# Patient Record
Sex: MALE | NOT HISPANIC OR LATINO | URBAN - METROPOLITAN AREA
[De-identification: names, ages, dates, MRNs, and addresses within clinical notes are randomized per-mention and may not be internally consistent; named-entity substitution may affect disease eponyms.]

---

## 2023-05-04 ENCOUNTER — APPOINTMENT (OUTPATIENT)
Dept: PRIMARY CARE | Facility: CLINIC | Age: 23
End: 2023-05-04
Payer: COMMERCIAL

## 2024-03-15 ENCOUNTER — LAB (OUTPATIENT)
Dept: LAB | Facility: LAB | Age: 24
End: 2024-03-15
Payer: COMMERCIAL

## 2024-03-15 DIAGNOSIS — Z79.899 OTHER LONG TERM (CURRENT) DRUG THERAPY: Primary | ICD-10-CM

## 2024-03-15 LAB
ALBUMIN SERPL BCP-MCNC: 4.5 G/DL (ref 3.4–5)
ALP SERPL-CCNC: 52 U/L (ref 33–120)
ALT SERPL W P-5'-P-CCNC: 11 U/L (ref 10–52)
ANION GAP SERPL CALC-SCNC: 10 MMOL/L (ref 10–20)
AST SERPL W P-5'-P-CCNC: 19 U/L (ref 9–39)
BASOPHILS # BLD AUTO: 0.02 X10*3/UL (ref 0–0.1)
BASOPHILS NFR BLD AUTO: 0.5 %
BILIRUB SERPL-MCNC: 0.7 MG/DL (ref 0–1.2)
BUN SERPL-MCNC: 11 MG/DL (ref 6–23)
CALCIUM SERPL-MCNC: 9.7 MG/DL (ref 8.6–10.6)
CHLORIDE SERPL-SCNC: 101 MMOL/L (ref 98–107)
CHOLEST SERPL-MCNC: 130 MG/DL (ref 0–199)
CHOLESTEROL/HDL RATIO: 2.3
CO2 SERPL-SCNC: 29 MMOL/L (ref 21–32)
CREAT SERPL-MCNC: 1.01 MG/DL (ref 0.5–1.3)
EGFRCR SERPLBLD CKD-EPI 2021: >90 ML/MIN/1.73M*2
EOSINOPHIL # BLD AUTO: 0.08 X10*3/UL (ref 0–0.7)
EOSINOPHIL NFR BLD AUTO: 2.1 %
ERYTHROCYTE [DISTWIDTH] IN BLOOD BY AUTOMATED COUNT: 12.9 % (ref 11.5–14.5)
EST. AVERAGE GLUCOSE BLD GHB EST-MCNC: 97 MG/DL
GLUCOSE SERPL-MCNC: 79 MG/DL (ref 74–99)
HBA1C MFR BLD: 5 %
HCT VFR BLD AUTO: 50.5 % (ref 41–52)
HDLC SERPL-MCNC: 55.4 MG/DL
HGB BLD-MCNC: 15.9 G/DL (ref 13.5–17.5)
IMM GRANULOCYTES # BLD AUTO: 0.01 X10*3/UL (ref 0–0.7)
IMM GRANULOCYTES NFR BLD AUTO: 0.3 % (ref 0–0.9)
LDLC SERPL CALC-MCNC: 57 MG/DL
LYMPHOCYTES # BLD AUTO: 1.45 X10*3/UL (ref 1.2–4.8)
LYMPHOCYTES NFR BLD AUTO: 37.4 %
MCH RBC QN AUTO: 27.7 PG (ref 26–34)
MCHC RBC AUTO-ENTMCNC: 31.5 G/DL (ref 32–36)
MCV RBC AUTO: 88 FL (ref 80–100)
MONOCYTES # BLD AUTO: 0.31 X10*3/UL (ref 0.1–1)
MONOCYTES NFR BLD AUTO: 8 %
NEUTROPHILS # BLD AUTO: 2.01 X10*3/UL (ref 1.2–7.7)
NEUTROPHILS NFR BLD AUTO: 51.7 %
NON HDL CHOLESTEROL: 75 MG/DL (ref 0–149)
NRBC BLD-RTO: 0 /100 WBCS (ref 0–0)
PLATELET # BLD AUTO: 223 X10*3/UL (ref 150–450)
POTASSIUM SERPL-SCNC: 4.3 MMOL/L (ref 3.5–5.3)
PROT SERPL-MCNC: 7.5 G/DL (ref 6.4–8.2)
RBC # BLD AUTO: 5.74 X10*6/UL (ref 4.5–5.9)
SODIUM SERPL-SCNC: 136 MMOL/L (ref 136–145)
TRIGL SERPL-MCNC: 86 MG/DL (ref 0–149)
TSH SERPL-ACNC: 0.98 MIU/L (ref 0.44–3.98)
VLDL: 17 MG/DL (ref 0–40)
WBC # BLD AUTO: 3.9 X10*3/UL (ref 4.4–11.3)

## 2024-03-15 PROCEDURE — 80053 COMPREHEN METABOLIC PANEL: CPT

## 2024-03-15 PROCEDURE — 36415 COLL VENOUS BLD VENIPUNCTURE: CPT

## 2024-03-15 PROCEDURE — 85025 COMPLETE CBC W/AUTO DIFF WBC: CPT

## 2024-03-15 PROCEDURE — 80061 LIPID PANEL: CPT

## 2024-03-15 PROCEDURE — 84443 ASSAY THYROID STIM HORMONE: CPT

## 2024-03-15 PROCEDURE — 83036 HEMOGLOBIN GLYCOSYLATED A1C: CPT

## 2024-07-22 ENCOUNTER — APPOINTMENT (OUTPATIENT)
Dept: CARDIOLOGY | Facility: HOSPITAL | Age: 24
End: 2024-07-22
Payer: COMMERCIAL

## 2024-07-22 ENCOUNTER — HOSPITAL ENCOUNTER (EMERGENCY)
Facility: HOSPITAL | Age: 24
Discharge: OTHER INSTITUTION | End: 2024-07-23
Attending: STUDENT IN AN ORGANIZED HEALTH CARE EDUCATION/TRAINING PROGRAM
Payer: COMMERCIAL

## 2024-07-22 DIAGNOSIS — F29 PSYCHOSIS, UNSPECIFIED PSYCHOSIS TYPE (MULTI): Primary | ICD-10-CM

## 2024-07-22 LAB
ALBUMIN SERPL BCP-MCNC: 4.6 G/DL (ref 3.4–5)
ALP SERPL-CCNC: 62 U/L (ref 33–120)
ALT SERPL W P-5'-P-CCNC: 11 U/L (ref 10–52)
AMPHETAMINES UR QL SCN: NORMAL
ANION GAP SERPL CALC-SCNC: 17 MMOL/L (ref 10–20)
APAP SERPL-MCNC: <10 UG/ML
AST SERPL W P-5'-P-CCNC: 28 U/L (ref 9–39)
BARBITURATES UR QL SCN: NORMAL
BASOPHILS # BLD AUTO: 0.03 X10*3/UL (ref 0–0.1)
BASOPHILS NFR BLD AUTO: 0.4 %
BENZODIAZ UR QL SCN: NORMAL
BILIRUB SERPL-MCNC: 0.9 MG/DL (ref 0–1.2)
BUN SERPL-MCNC: 9 MG/DL (ref 6–23)
BZE UR QL SCN: NORMAL
CALCIUM SERPL-MCNC: 9.2 MG/DL (ref 8.6–10.3)
CANNABINOIDS UR QL SCN: NORMAL
CHLORIDE SERPL-SCNC: 97 MMOL/L (ref 98–107)
CO2 SERPL-SCNC: 24 MMOL/L (ref 21–32)
CREAT SERPL-MCNC: 0.97 MG/DL (ref 0.5–1.3)
EGFRCR SERPLBLD CKD-EPI 2021: >90 ML/MIN/1.73M*2
EOSINOPHIL # BLD AUTO: 0.04 X10*3/UL (ref 0–0.7)
EOSINOPHIL NFR BLD AUTO: 0.6 %
ERYTHROCYTE [DISTWIDTH] IN BLOOD BY AUTOMATED COUNT: 12.9 % (ref 11.5–14.5)
ETHANOL SERPL-MCNC: <10 MG/DL
FENTANYL+NORFENTANYL UR QL SCN: NORMAL
GLUCOSE SERPL-MCNC: 130 MG/DL (ref 74–99)
HCT VFR BLD AUTO: 43.8 % (ref 41–52)
HGB BLD-MCNC: 15.3 G/DL (ref 13.5–17.5)
IMM GRANULOCYTES # BLD AUTO: 0.02 X10*3/UL (ref 0–0.7)
IMM GRANULOCYTES NFR BLD AUTO: 0.3 % (ref 0–0.9)
LYMPHOCYTES # BLD AUTO: 1.64 X10*3/UL (ref 1.2–4.8)
LYMPHOCYTES NFR BLD AUTO: 22.9 %
MCH RBC QN AUTO: 29 PG (ref 26–34)
MCHC RBC AUTO-ENTMCNC: 34.9 G/DL (ref 32–36)
MCV RBC AUTO: 83 FL (ref 80–100)
METHADONE UR QL SCN: NORMAL
MONOCYTES # BLD AUTO: 0.68 X10*3/UL (ref 0.1–1)
MONOCYTES NFR BLD AUTO: 9.5 %
NEUTROPHILS # BLD AUTO: 4.74 X10*3/UL (ref 1.2–7.7)
NEUTROPHILS NFR BLD AUTO: 66.3 %
NRBC BLD-RTO: 0 /100 WBCS (ref 0–0)
OPIATES UR QL SCN: NORMAL
OXYCODONE+OXYMORPHONE UR QL SCN: NORMAL
PCP UR QL SCN: NORMAL
PLATELET # BLD AUTO: 224 X10*3/UL (ref 150–450)
POTASSIUM SERPL-SCNC: 3.2 MMOL/L (ref 3.5–5.3)
PROT SERPL-MCNC: 7.4 G/DL (ref 6.4–8.2)
RBC # BLD AUTO: 5.27 X10*6/UL (ref 4.5–5.9)
SALICYLATES SERPL-MCNC: <3 MG/DL
SODIUM SERPL-SCNC: 135 MMOL/L (ref 136–145)
WBC # BLD AUTO: 7.2 X10*3/UL (ref 4.4–11.3)

## 2024-07-22 PROCEDURE — 96372 THER/PROPH/DIAG INJ SC/IM: CPT | Performed by: STUDENT IN AN ORGANIZED HEALTH CARE EDUCATION/TRAINING PROGRAM

## 2024-07-22 PROCEDURE — 80053 COMPREHEN METABOLIC PANEL: CPT | Performed by: NURSE PRACTITIONER

## 2024-07-22 PROCEDURE — 99285 EMERGENCY DEPT VISIT HI MDM: CPT

## 2024-07-22 PROCEDURE — 80307 DRUG TEST PRSMV CHEM ANLYZR: CPT | Performed by: NURSE PRACTITIONER

## 2024-07-22 PROCEDURE — 93005 ELECTROCARDIOGRAM TRACING: CPT

## 2024-07-22 PROCEDURE — 36415 COLL VENOUS BLD VENIPUNCTURE: CPT | Performed by: NURSE PRACTITIONER

## 2024-07-22 PROCEDURE — 80143 DRUG ASSAY ACETAMINOPHEN: CPT | Performed by: NURSE PRACTITIONER

## 2024-07-22 PROCEDURE — 2500000004 HC RX 250 GENERAL PHARMACY W/ HCPCS (ALT 636 FOR OP/ED): Mod: JZ | Performed by: STUDENT IN AN ORGANIZED HEALTH CARE EDUCATION/TRAINING PROGRAM

## 2024-07-22 PROCEDURE — 2500000001 HC RX 250 WO HCPCS SELF ADMINISTERED DRUGS (ALT 637 FOR MEDICARE OP): Performed by: STUDENT IN AN ORGANIZED HEALTH CARE EDUCATION/TRAINING PROGRAM

## 2024-07-22 PROCEDURE — 85025 COMPLETE CBC W/AUTO DIFF WBC: CPT | Performed by: NURSE PRACTITIONER

## 2024-07-22 RX ORDER — ACETAMINOPHEN 325 MG/1
975 TABLET ORAL ONCE
Status: COMPLETED | OUTPATIENT
Start: 2024-07-22 | End: 2024-07-22

## 2024-07-22 RX ORDER — POTASSIUM CHLORIDE 1.5 G/1.58G
40 POWDER, FOR SOLUTION ORAL ONCE
Status: COMPLETED | OUTPATIENT
Start: 2024-07-22 | End: 2024-07-22

## 2024-07-22 RX ORDER — IBUPROFEN 600 MG/1
600 TABLET ORAL ONCE
Status: COMPLETED | OUTPATIENT
Start: 2024-07-22 | End: 2024-07-22

## 2024-07-22 RX ORDER — OLANZAPINE 10 MG/2ML
10 INJECTION, POWDER, FOR SOLUTION INTRAMUSCULAR ONCE
Status: COMPLETED | OUTPATIENT
Start: 2024-07-22 | End: 2024-07-22

## 2024-07-22 SDOH — HEALTH STABILITY: MENTAL HEALTH

## 2024-07-22 SDOH — HEALTH STABILITY: MENTAL HEALTH: NEEDS EXPRESSED: OTHER (COMMENT)

## 2024-07-22 SDOH — SOCIAL STABILITY: SOCIAL INSECURITY: FAMILY BEHAVIORS: APPROPRIATE FOR SITUATION;CALM;COOPERATIVE;SUPPORTIVE

## 2024-07-22 SDOH — SOCIAL STABILITY: SOCIAL NETWORK: EMOTIONAL SUPPORT GIVEN: PATIENT AND FAMILY COUNSELING

## 2024-07-22 SDOH — HEALTH STABILITY: MENTAL HEALTH: HALLUCINATION: UNABLE TO ASSESS

## 2024-07-22 SDOH — SOCIAL STABILITY: SOCIAL NETWORK: PARENT/GUARDIAN/SIGNIFICANT OTHER INVOLVEMENT: ATTENTIVE TO PATIENT NEEDS

## 2024-07-22 SDOH — HEALTH STABILITY: MENTAL HEALTH: BEHAVIORS/MOOD: AGGRESSIVE PHYSICALLY, OTHERS;CALM;COOPERATIVE;COMBATIVE;FLAT AFFECT;NONVERBAL

## 2024-07-22 ASSESSMENT — PAIN - FUNCTIONAL ASSESSMENT: PAIN_FUNCTIONAL_ASSESSMENT: UNABLE TO SELF-REPORT
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
## 2024-07-23 VITALS
OXYGEN SATURATION: 99 % | DIASTOLIC BLOOD PRESSURE: 79 MMHG | TEMPERATURE: 97.2 F | HEART RATE: 74 BPM | SYSTOLIC BLOOD PRESSURE: 112 MMHG | WEIGHT: 150 LBS | BODY MASS INDEX: 25.61 KG/M2 | RESPIRATION RATE: 15 BRPM | HEIGHT: 64 IN

## 2024-07-23 LAB
ATRIAL RATE: 101 BPM
P AXIS: 48 DEGREES
P OFFSET: 197 MS
P ONSET: 146 MS
PR INTERVAL: 146 MS
Q ONSET: 219 MS
QRS COUNT: 16 BEATS
QRS DURATION: 90 MS
QT INTERVAL: 332 MS
QTC CALCULATION(BAZETT): 430 MS
QTC FREDERICIA: 395 MS
R AXIS: 62 DEGREES
T AXIS: 38 DEGREES
T OFFSET: 385 MS
VENTRICULAR RATE: 101 BPM

## 2024-07-23 RX ORDER — ARIPIPRAZOLE 10 MG/1
10 TABLET ORAL DAILY
COMMUNITY

## 2024-07-23 SDOH — HEALTH STABILITY: MENTAL HEALTH: NEEDS EXPRESSED: PHYSICAL

## 2024-07-23 SDOH — HEALTH STABILITY: MENTAL HEALTH: IN THE PAST FEW WEEKS, HAVE YOU WISHED YOU WERE DEAD?: NO

## 2024-07-23 SDOH — HEALTH STABILITY: MENTAL HEALTH: DELUSIONS: CONTROLLED

## 2024-07-23 SDOH — HEALTH STABILITY: MENTAL HEALTH: WISH TO BE DEAD (PAST 1 MONTH): NO

## 2024-07-23 SDOH — ECONOMIC STABILITY: HOUSING INSECURITY: FEELS SAFE LIVING IN HOME: YES

## 2024-07-23 SDOH — HEALTH STABILITY: MENTAL HEALTH: SUICIDAL BEHAVIOR (LIFETIME): NO

## 2024-07-23 SDOH — HEALTH STABILITY: MENTAL HEALTH: BEHAVIORS/MOOD: COOPERATIVE;CALM

## 2024-07-23 SDOH — HEALTH STABILITY: MENTAL HEALTH: SLEEP PATTERN: SLEEPS ALL NIGHT

## 2024-07-23 SDOH — HEALTH STABILITY: MENTAL HEALTH: HAVE YOU EVER DONE ANYTHING, STARTED TO DO ANYTHING, OR PREPARED TO DO ANYTHING TO END YOUR LIFE?: NO

## 2024-07-23 SDOH — HEALTH STABILITY: MENTAL HEALTH: IN THE PAST FEW WEEKS, HAVE YOU FELT THAT YOU OR YOUR FAMILY WOULD BE BETTER OFF IF YOU WERE DEAD?: NO

## 2024-07-23 SDOH — HEALTH STABILITY: MENTAL HEALTH: ANXIETY SYMPTOMS: NO PROBLEMS REPORTED OR OBSERVED.

## 2024-07-23 SDOH — HEALTH STABILITY: MENTAL HEALTH: DEPRESSION SYMPTOMS: IMPAIRED CONCENTRATION;INCREASED IRRITABILITY;APPETITE CHANGE

## 2024-07-23 SDOH — HEALTH STABILITY: MENTAL HEALTH: HAVE YOU EVER TRIED TO KILL YOURSELF?: NO

## 2024-07-23 SDOH — HEALTH STABILITY: MENTAL HEALTH

## 2024-07-23 SDOH — HEALTH STABILITY: MENTAL HEALTH: ACTIVE SUICIDAL IDEATION WITH SPECIFIC PLAN AND INTENT (PAST 1 MONTH): NO

## 2024-07-23 SDOH — HEALTH STABILITY: MENTAL HEALTH: HAVE YOU WISHED YOU WERE DEAD OR WISHED YOU COULD GO TO SLEEP AND NOT WAKE UP?: NO

## 2024-07-23 SDOH — HEALTH STABILITY: MENTAL HEALTH: ACTIVE SUICIDAL IDEATION WITH SOME INTENT TO ACT, WITHOUT SPECIFIC PLAN (PAST 1 MONTH): NO

## 2024-07-23 SDOH — ECONOMIC STABILITY: GENERAL

## 2024-07-23 SDOH — HEALTH STABILITY: MENTAL HEALTH: CONTENT: UNREMARKABLE

## 2024-07-23 SDOH — HEALTH STABILITY: MENTAL HEALTH: ARE YOU HAVING THOUGHTS OF KILLING YOURSELF RIGHT NOW?: NO

## 2024-07-23 SDOH — HEALTH STABILITY: MENTAL HEALTH: HAVE YOU ACTUALLY HAD ANY THOUGHTS OF KILLING YOURSELF?: NO

## 2024-07-23 SDOH — HEALTH STABILITY: MENTAL HEALTH: NON-SPECIFIC ACTIVE SUICIDAL THOUGHTS (PAST 1 MONTH): YES

## 2024-07-23 SDOH — HEALTH STABILITY: MENTAL HEALTH: SUICIDE ASSESSMENT: ADULT (C-SSRS)

## 2024-07-23 SDOH — HEALTH STABILITY: MENTAL HEALTH: IN THE PAST WEEK, HAVE YOU BEEN HAVING THOUGHTS ABOUT KILLING YOURSELF?: YES

## 2024-07-23 ASSESSMENT — LIFESTYLE VARIABLES
SUBSTANCE_ABUSE_PAST_12_MONTHS: NO
PRESCIPTION_ABUSE_PAST_12_MONTHS: NO

## 2024-07-23 ASSESSMENT — COLUMBIA-SUICIDE SEVERITY RATING SCALE - C-SSRS
6. HAVE YOU EVER DONE ANYTHING, STARTED TO DO ANYTHING, OR PREPARED TO DO ANYTHING TO END YOUR LIFE?: NO
1. SINCE LAST CONTACT, HAVE YOU WISHED YOU WERE DEAD OR WISHED YOU COULD GO TO SLEEP AND NOT WAKE UP?: NO
2. HAVE YOU ACTUALLY HAD ANY THOUGHTS OF KILLING YOURSELF?: NO

## 2024-07-23 ASSESSMENT — PAIN SCALES - GENERAL: PAINLEVEL_OUTOF10: 0 - NO PAIN

## 2024-07-23 ASSESSMENT — PAIN - FUNCTIONAL ASSESSMENT: PAIN_FUNCTIONAL_ASSESSMENT: 0-10

## 2024-07-23 ASSESSMENT — ACTIVITIES OF DAILY LIVING (ADL): LACK_OF_TRANSPORTATION: NO

## 2024-07-23 NOTE — ED NOTES
Upon checking on pt, pt sleeping, no distress. Easily awaken with light speech. Pt given water as requested. No c/o's at this time. Waiting for bed placement.      Alex Borden RN  07/23/24 6165

## 2024-07-23 NOTE — PROGRESS NOTES
For full history, physical exam, and prior hospital course, please see previous ED provider note. This is in addition to the primary record.    Patient received in sign out from prior provider team pending EPAT evaluation.  He is a 23-year-old schizophrenic patient who has been off his meds, was agitated and aggressive at home and required restraint by family to bring him here.  He received Zyprexa on arrival.    ED Course as of 07/23/24 0803   Mon Jul 22, 2024 2026 CBC and Auto Differential  No leukocytosis, anemia, or thrombocytopenia     [SS]   2026 External chart review:  DC summary Sept 2019  Admitted for SI and psychosis     [SS]   2028 Comprehensive Metabolic Panel(!)  Hypokalemia, no AURORA, no signs of acute liver injury or obstructive biliary pathology [SS]   2028 Acute Toxicology Panel, Blood  neg [SS]   2029 Electrocardiogram, 12-lead  I have personally reviewed and interpreted this EKG.  Sinus tachycardia, rate 101 BPM  Normal axis  MN interval and QRS duration within normal limits.  QTc within normal limits.  No signs of acute ischemia or infarction   [SS]   2114 Drug Screen, Urine  neg [SS]   Tue Jul 23, 2024 0212 EPAT called and notified they plan to place him. He is medically cleared.  [CG]      ED Course User Index  [CG] Lianna Aguilar MD  [SS] Steffen Simerlink, MD         Diagnoses as of 07/23/24 0803   Psychosis, unspecified psychosis type (Multi)        Lianna Aguilar MD  EM/IM/Peds    This note was dictated by speech recognition. Minor errors in transcription may be present.
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
## 2024-07-23 NOTE — PROGRESS NOTES
Pharmacy Medication History Review    Per pharmacy. Most recent dose and directions     German Galarza is a 23 y.o. male admitted for No Principal Problem: There is no principal problem currently on the Problem List. Please update the Problem List and refresh.. Pharmacy reviewed the patient's [id number]-[id number] medications and allergies for accuracy.    The list below reflectives the updated PTA list. Please review each medication in order reconciliation for additional clarification and justification.       The list below reflectives the updated allergy list. Please review each documented allergy for additional clarification and justification.  Allergies  Reviewed by Ania Tomlin RN on 7/22/2024   No Known Allergies         Below are additional concerns with the patient's PTA list.  Prior to Admission Medications   Prescriptions Last Dose Informant   ARIPiprazole (Abilify) 10 mg tablet     Sig: Take 1 tablet (10 mg) by mouth once daily.      Facility-Administered Medications: None        Mouna Gonzalez

## 2024-07-23 NOTE — PROGRESS NOTES
07/23/24 0800   ACS Disability Status   Are you deaf or do you have serious difficulty hearing? N   Are you blind or do you have serious difficulty seeing, even when wearing glasses? N   Because of a physical, mental, or emotional condition, do you have serious difficulty concentrating, remembering, or making decisions? (5 years old or older) Y   Do you have serious difficulty walking or climbing stairs? N   Do you have serious difficulty dressing or bathing? N   Because of a physical, mental, or emotional condition, do you have serious difficulty doing errands alone such as visiting the doctor? N

## 2024-07-23 NOTE — PROGRESS NOTES
Home with parents, brother, and grandparent  EP[facility] to [facility]      07/23/24 0800   Current Planned Discharge Disposition   Current Planned Discharge Disposition Psych

## 2024-07-23 NOTE — SIGNIFICANT EVENT
Application for Emergency Admission      Ready for Transfer?  Is the patient medically cleared for transfer to inpatient psychiatry: Yes  Has the patient been accepted to an inpatient psychiatric hospital: No    Application for Emergency Admission  IN ACCORDANCE WITH [place]  The Chief Clinical Officer of: [facility] 7/23/2024 .1:44 AM    Reason for Hospitalization  The undersigned has reason to believe that: German Galarza Is a mentally ill person subject to hospitalization by court order under division B Section 5122.01 of the Revised Code, i.e., this person:    1.No  Represents a substantial risk of physical harm to self as manifested by evidence of threats of, or attempts at, suicide or serious self-inflicted bodily harm    2.Yes Represents a substantial risk of physical harm to others as manifested by evidence of recent homicidal or other violent behavior, evidence of recent threats that place another in reasonable fear of violent behavior and serious physical harm, or other evidence of present dangerousness    3.Yes Represents a substantial and immediate risk of serious physical impairment or injury to self as manifested by  evidence that the person is unable to provide for and is not providing for the person's basic physical needs because of the person's mental illness and that appropriate provision for those needs cannot be made  immediately available in the community    4.Yes Would benefit from treatment in a hospital for his mental illness and is in need of such treatment as manifested by evidence of behavior that creates a grave and imminent risk to substantial rights of others or  himself.    5.No Would benefit from treatment as manifested by evidence of behavior that indicates all of the following:       (a) The person is unlikely to survive safely in the community without supervision, based on a clinical determination.       (b) The person has a history of lack of compliance with  treatment for mental illness and one of the following applies:      (i) At least twice within the thirty-six months prior to the filing of an affidavit seeking court-ordered treatment of the person under section 5122.111 of the Revised Code, the lack of compliance has been a significant factor in necessitating hospitalization in a hospital or receipt of services in a forensic or other mental health unit of a correctional facility, provided that the thirty-six-month period shall be extended by the length of any hospitalization or incarceration of the person that occurred within the thirty-six-month period.      (ii) Within the forty-eight months prior to the filing of an affidavit seeking court-ordered treatment of the person under section 5122.111 of the Revised Code, the lack of compliance resulted in one or more acts of serious violent behavior toward self or others or threats of, or attempts at, serious physical harm to self or others, provided that the forty-eight-month period shall be extended by the length of any hospitalization or incarceration of the person that occurred within the forty-eight-month period.      (c) The person, as a result of mental illness, is unlikely to voluntarily participate in necessary treatment.       (d) In view of the person's treatment history and current behavior, the person is in need of treatment in order to prevent a relapse or deterioration that would be likely to result in substantial risk of serious harm to the person or others.    (e) Represents a substantial risk of physical harm to self or others if allowed to remain at liberty pending examination.    Therefore, it is requested that said person be admitted to the above named facility.    STATEMENT OF BELIEF    Must be filled out by one of the following: a psychiatrist, licensed physician, licensed clinical psychologist, health or ,  or .  (Statement shall include the circumstances under  which the individual was taken into custody and the reason for the person's belief that hospitalization is necessary. The statement shall also include a reference to efforts made to secure the individual's property at his residence if he was taken into custody there. Every reasonable and appropriate effort should be made to take this person into custody in the least conspicuous manner possible.)    Patient presents with aggressive behavior and there is concern for decompensated schizophrenia.  Does not have insight into his psychiatric illness.  Would benefit from continued observation while awaiting EPAT recommendations    Steffen Simerlink, MD 7/23/2024     _____________________________________________________________   Place of Employment: [facility]    STATEMENT OF OBSERVATION BY PSYCHIATRIST, LICENSED PHYSICIAN, OR LICENSED CLINICAL PSYCHOLOGIST, IF APPLICABLE    Place of Observation (e.g., [facility], general hospital, office, emergency facility)  (If applicable, please complete)    Steffen Simerlink, MD 7/23/2024    _____________________________________________________________

## 2024-07-23 NOTE — PROGRESS NOTES
Patient is a 23-year-old male who presented to the emergency room with a chief complaint of requiring psychiatric assessment.  He was initially seen and evaluated by Dr. Aguilar who signed him out to me pending placement by her emergency psychiatric team after being pink slipped.  Please see her initial physician note for details.  Patient was determined to require inpatient hospitalization for psychiatric treatment.  He has remained in stable condition while under my care and accepted to [facility] for further workup and management.    Maira Callejas MD

## 2024-07-23 NOTE — PROGRESS NOTES
Spiritual Care Visit    Clinical Encounter Type  Visited With: Patient  Routine Visit: Introduction  Continue Visiting: No  Referral From: Nurse  Referral To:     Synagogue Encounters  Synagogue Needs: Synagogue articles (Pt requested a Bible)    The pt stated that he was in a dark place and needed the word of God. The request for a Bible was fulfilled. He was told that he is a child of the Light. He was also given other words of encouragement that were seemingly accepted.  The Bible was given as a gift. He understood that it was his to keep.  There will be another visit scheduled if requested.     Chaplain Ken Medina
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
## 2024-07-23 NOTE — SIGNIFICANT EVENT
Application for Emergency Admission      Ready for Transfer?  Is the patient medically cleared for transfer to inpatient psychiatry: Yes  Has the patient been accepted to an inpatient psychiatric hospital: Yes    Application for Emergency Admission  IN ACCORDANCE WITH [place]  The Chief Clinical Officer of: [place] 7/23/2024 .2:14 AM    Reason for Hospitalization  The undersigned has reason to believe that: German Galarza Is a mentally ill person subject to hospitalization by court order under division B Section 5122.01 of the Revised Code, i.e., this person:    1.Yes  Represents a substantial risk of physical harm to self as manifested by evidence of threats of, or attempts at, suicide or serious self-inflicted bodily harm    2.Yes Represents a substantial risk of physical harm to others as manifested by evidence of recent homicidal or other violent behavior, evidence of recent threats that place another in reasonable fear of violent behavior and serious physical harm, or other evidence of present dangerousness    3.Yes Represents a substantial and immediate risk of serious physical impairment or injury to self as manifested by  evidence that the person is unable to provide for and is not providing for the person's basic physical needs because of the person's mental illness and that appropriate provision for those needs cannot be made  immediately available in the community    4.Yes Would benefit from treatment in a hospital for his mental illness and is in need of such treatment as manifested by evidence of behavior that creates a grave and imminent risk to substantial rights of others or  himself.    5.Yes Would benefit from treatment as manifested by evidence of behavior that indicates all of the following:       (a) The person is unlikely to survive safely in the community without supervision, based on a clinical determination.       (b) The person has a history of lack of compliance with  treatment for mental illness and one of the following applies:      (i) At least twice within the thirty-six months prior to the filing of an affidavit seeking court-ordered treatment of the person under section 5122.111 of the Revised Code, the lack of compliance has been a significant factor in necessitating hospitalization in a hospital or receipt of services in a forensic or other mental health unit of a correctional facility, provided that the thirty-six-month period shall be extended by the length of any hospitalization or incarceration of the person that occurred within the thirty-six-month period.      (ii) Within the forty-eight months prior to the filing of an affidavit seeking court-ordered treatment of the person under section 5122.111 of the Revised Code, the lack of compliance resulted in one or more acts of serious violent behavior toward self or others or threats of, or attempts at, serious physical harm to self or others, provided that the forty-eight-month period shall be extended by the length of any hospitalization or incarceration of the person that occurred within the forty-eight-month period.      (c) The person, as a result of mental illness, is unlikely to voluntarily participate in necessary treatment.       (d) In view of the person's treatment history and current behavior, the person is in need of treatment in order to prevent a relapse or deterioration that would be likely to result in substantial risk of serious harm to the person or others.    (e) Represents a substantial risk of physical harm to self or others if allowed to remain at liberty pending examination.    Therefore, it is requested that said person be admitted to the above named facility.    STATEMENT OF BELIEF    Must be filled out by one of the following: a psychiatrist, licensed physician, licensed clinical psychologist, health or ,  or .  (Statement shall include the circumstances under  which the individual was taken into custody and the reason for the person's belief that hospitalization is necessary. The statement shall also include a reference to efforts made to secure the individual's property at his residence if he was taken into custody there. Every reasonable and appropriate effort should be made to take this person into custody in the least conspicuous manner possible.)    Patient presents with aggressive behavior and there is concern for decompensated schizophrenia. Does not have insight into his psychiatric illness. Would benefit from inpatient psychiatric admission to restart medications and treat his decompensation.      Lianna Aguilar MD 7/23/2024     Patient requires inpatient hospitalization for psychiatric evaluation and treatment.    _____________________________________________________________   Place of Employment: [facility]    STATEMENT OF OBSERVATION BY PSYCHIATRIST, LICENSED PHYSICIAN, OR LICENSED CLINICAL PSYCHOLOGIST, IF APPLICABLE    Place of Observation (e.g., [facility], general hospital, office, emergency facility)  (If applicable, please complete)    Lianna Aguilar MD 7/23/2024    _____________________________________________________________
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
## 2024-11-08 ENCOUNTER — APPOINTMENT (OUTPATIENT)
Dept: PRIMARY CARE | Facility: CLINIC | Age: 24
End: 2024-11-08
Payer: COMMERCIAL

## 2024-11-08 VITALS
WEIGHT: 150 LBS | DIASTOLIC BLOOD PRESSURE: 70 MMHG | SYSTOLIC BLOOD PRESSURE: 120 MMHG | BODY MASS INDEX: 25.61 KG/M2 | HEIGHT: 64 IN

## 2024-11-08 DIAGNOSIS — Z00.00 HEALTH CARE MAINTENANCE: ICD-10-CM

## 2024-11-08 DIAGNOSIS — F20.89 OTHER SCHIZOPHRENIA: ICD-10-CM

## 2024-11-08 DIAGNOSIS — E55.9 VITAMIN D DEFICIENCY: ICD-10-CM

## 2024-11-08 DIAGNOSIS — F41.9 ANXIETY: Primary | ICD-10-CM

## 2024-11-08 ASSESSMENT — ENCOUNTER SYMPTOMS
NEUROLOGICAL NEGATIVE: 1
CONSTITUTIONAL NEGATIVE: 1
PSYCHIATRIC NEGATIVE: 1
GASTROINTESTINAL NEGATIVE: 1
MUSCULOSKELETAL NEGATIVE: 1
CARDIOVASCULAR NEGATIVE: 1
RESPIRATORY NEGATIVE: 1

## 2024-11-08 NOTE — PROGRESS NOTES
"Subjective   Patient ID: German Galarza is a 24 y.o. male who presents for Establish Care.    24-year-old male with past medical history of schizophrenia who presents for establishment of care.  He regards that he is overall doing well, however has been following with a mother psych provider but would like a new referral.  Otherwise, is on olanzapine, has tried multiple medications with regards to his schizophrenia, and states that his symptoms are overall stable at this time.  Otherwise, states no acute issues or concerns.  Tolerates his medications, is up-to-date on flu shot.  Would like routine blood draws as well.  Otherwise states no additional issues.    Past medical history as above  Past surgical history denies  Social history denies any alcohol drug tobacco use  FHM hypertension, diabetes         Review of Systems   Constitutional: Negative.    HENT: Negative.     Respiratory: Negative.     Cardiovascular: Negative.    Gastrointestinal: Negative.    Musculoskeletal: Negative.    Skin: Negative.    Neurological: Negative.    Psychiatric/Behavioral: Negative.         Objective   /70   Ht 1.626 m (5' 4\")   Wt 68 kg (150 lb)   BMI 25.75 kg/m²     Physical Exam  Constitutional:       General: He is not in acute distress.     Appearance: He is not ill-appearing.   Eyes:      Pupils: Pupils are equal, round, and reactive to light.   Cardiovascular:      Rate and Rhythm: Normal rate and regular rhythm.      Pulses: Normal pulses.      Heart sounds: No murmur heard.  Pulmonary:      Effort: No respiratory distress.      Breath sounds: No wheezing.   Abdominal:      General: Abdomen is flat. Bowel sounds are normal. There is no distension.   Musculoskeletal:      Right lower leg: No edema.      Left lower leg: No edema.   Skin:     General: Skin is warm and dry.   Neurological:      Mental Status: He is alert. Mental status is at baseline.      Cranial Nerves: No cranial nerve deficit.      Motor: No weakness. "   Psychiatric:         Mood and Affect: Mood normal.         Behavior: Behavior normal.         Assessment/Plan   Problem List Items Addressed This Visit             ICD-10-CM    Other schizophrenia F20.89    Relevant Orders    Hemoglobin A1C    Lipid panel    TSH with reflex to Free T4 if abnormal     Other Visit Diagnoses         Codes    Anxiety    -  Primary F41.9    Relevant Orders    Referral to Psychiatry    Vitamin D deficiency     E55.9    Relevant Orders    Vitamin D 25-Hydroxy,Total (for eval of Vitamin D levels)    Health care maintenance     Z00.00    Relevant Orders    CBC    Comprehensive Metabolic Panel    Hemoglobin A1C    Lipid panel    TSH with reflex to Free T4 if abnormal             Patient seen for establish care    #Anxiety  #Schizophrenia  Continue current medications, monitor for side effects monitor for metabolic syndrome appreciate psych recommendations    #Health maintenance  Routine labs today  Advise age-appropriate vaccinations  Anxiety and schizophrenia management as above    Return to care in 6 to 12 months or as needed

## 2025-02-25 ENCOUNTER — APPOINTMENT (OUTPATIENT)
Dept: BEHAVIORAL HEALTH | Facility: CLINIC | Age: 25
End: 2025-02-25
Payer: COMMERCIAL

## 2025-02-25 VITALS
DIASTOLIC BLOOD PRESSURE: 71 MMHG | HEART RATE: 83 BPM | WEIGHT: 153.3 LBS | BODY MASS INDEX: 26.17 KG/M2 | HEIGHT: 64 IN | SYSTOLIC BLOOD PRESSURE: 123 MMHG | TEMPERATURE: 98.4 F | RESPIRATION RATE: 16 BRPM

## 2025-02-25 DIAGNOSIS — F41.9 ANXIETY: ICD-10-CM

## 2025-02-25 DIAGNOSIS — F20.89 OTHER SCHIZOPHRENIA: Primary | ICD-10-CM

## 2025-02-25 PROCEDURE — 90792 PSYCH DIAG EVAL W/MED SRVCS: CPT | Performed by: PSYCHIATRY & NEUROLOGY

## 2025-02-25 PROCEDURE — 3008F BODY MASS INDEX DOCD: CPT | Performed by: PSYCHIATRY & NEUROLOGY

## 2025-02-25 RX ORDER — OLANZAPINE 10 MG/1
10 TABLET ORAL NIGHTLY
Qty: 90 TABLET | Refills: 1 | Status: SHIPPED | OUTPATIENT
Start: 2025-02-25 | End: 2025-08-24

## 2025-02-25 ASSESSMENT — COLUMBIA-SUICIDE SEVERITY RATING SCALE - C-SSRS
TOTAL  NUMBER OF INTERRUPTED ATTEMPTS LIFETIME: NO
1. HAVE YOU WISHED YOU WERE DEAD OR WISHED YOU COULD GO TO SLEEP AND NOT WAKE UP?: NO
6. HAVE YOU EVER DONE ANYTHING, STARTED TO DO ANYTHING, OR PREPARED TO DO ANYTHING TO END YOUR LIFE?: NO
2. HAVE YOU ACTUALLY HAD ANY THOUGHTS OF KILLING YOURSELF?: NO
TOTAL  NUMBER OF ABORTED OR SELF INTERRUPTED ATTEMPTS LIFETIME: NO
ATTEMPT LIFETIME: NO
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
## 2025-02-28 ASSESSMENT — ENCOUNTER SYMPTOMS
AGITATION: 0
NERVOUS/ANXIOUS: 0
DYSPHORIC MOOD: 0
SLEEP DISTURBANCE: 0
HALLUCINATIONS: 0
HYPERACTIVE: 0
CONFUSION: 0

## 2025-03-27 ENCOUNTER — APPOINTMENT (OUTPATIENT)
Dept: BEHAVIORAL HEALTH | Facility: CLINIC | Age: 25
End: 2025-03-27
Payer: COMMERCIAL

## 2025-03-27 DIAGNOSIS — F20.89 OTHER SCHIZOPHRENIA: Primary | ICD-10-CM

## 2025-03-27 PROCEDURE — 99214 OFFICE O/P EST MOD 30 MIN: CPT | Performed by: PSYCHIATRY & NEUROLOGY

## 2025-03-27 NOTE — PROGRESS NOTES
"Adult Ambulatory Psychiatry Progress Note    Pt is at home ([address] [facility] [id number] )  Writer is at home office    Virtual or Telephone Consent    An interactive audio and video telecommunication system which permits real time communications between the patient (at the originating site) and provider (at the distant site) was utilized to provide this telehealth service.   Verbal consent was requested and obtained from German Galarza on this date, 03/27/25 for a telehealth visit and the patient's location was confirmed at the time of the visit.      Assessment/Plan     Impression:  German Galarza is a 24 y.o. male domiciled with parents, unemployed who presents for follow up with CC of Schizophrenia.    Plan:   Schizophrenia, consider schizoaffective d/o - c/w olanzapine 10mg daily       Subjective     Chief Complaint: Schizophrenia    HPI:  Pt arrived on time. Mood \"ok\" since last session. Some times he feels happy and other times it's harder to get up. If he doesn't have anything going on he doesn't get up as readily. Denies significant depressed mood. Denies SI/HI. Denies harming anyone since last session. He may have heard something he wasn't sure was there but \"mostly inner thoughts\". It happens when he's trying to make decisions. Denies any VH. Sometimes he checks what's behind him but it may be caution about his surroundings to not get robbed. He's never been robbed before. Anxiety has been \"stable so far\". He goes to sleep at midnight. He sleeps through the night. He wakes at 11am or 12pm. He takes the olanzapine at 9:30pm. Denies missing any doses of olanzapine. He has some increased appetite from it. He's gained a few pounds. Pt hopes to get off the medicine soon \"with your counseling\". Will continue to discuss at next session.         Objective   Mental Status Exam:  General Appearance: Well groomed, appropriate eye contact  Attitude/Behavior: Cooperative  Motor: No psychomotor agitation or " "retardation, no tremor or other abnormal movements  Speech: Other: (comment) (monotone)  Mood: \"ok\"  Affect: Blunted  Thought Process: Linear, goal directed  Thought Associations: No loosening of associations  Thought Content: Normal  Perception: No perceptual abnormalities noted  Insight: Intact  Judgement: Intact    Vitals:  There were no vitals filed for this visit.    Current Medications:  Current Outpatient Medications on File Prior to Visit   Medication Sig Dispense Refill    OLANZapine (ZyPREXA) 10 mg tablet Take 1 tablet (10 mg) by mouth once daily at bedtime. 90 tablet 1     No current facility-administered medications on file prior to visit.       Lab Review:   No visits with results within 2 Month(s) from this visit.   Latest known visit with results is:   Admission on 07/22/2024, Discharged on 07/23/2024   Component Date Value    WBC 07/22/2024 7.2     nRBC 07/22/2024 0.0     RBC 07/22/2024 5.27     Hemoglobin 07/22/2024 15.3     Hematocrit 07/22/2024 43.8     MCV 07/22/2024 83     MCH 07/22/2024 29.0     MCHC 07/22/2024 34.9     RDW 07/22/2024 12.9     Platelets 07/22/2024 224     Neutrophils % 07/22/2024 66.3     Immature Granulocytes %,* 07/22/2024 0.3     Lymphocytes % 07/22/2024 22.9     Monocytes % 07/22/2024 9.5     Eosinophils % 07/22/2024 0.6     Basophils % 07/22/2024 0.4     Neutrophils Absolute 07/22/2024 4.74     Immature Granulocytes Ab* 07/22/2024 0.02     Lymphocytes Absolute 07/22/2024 1.64     Monocytes Absolute 07/22/2024 0.68     Eosinophils Absolute 07/22/2024 0.04     Basophils Absolute 07/22/2024 0.03     Glucose 07/22/2024 130 (H)     Sodium 07/22/2024 135 (L)     Potassium 07/22/2024 3.2 (L)     Chloride 07/22/2024 97 (L)     Bicarbonate 07/22/2024 24     Anion Gap 07/22/2024 17     Urea Nitrogen 07/22/2024 9     Creatinine 07/22/2024 0.97     eGFR 07/22/2024 >90     Calcium 07/22/2024 9.2     Albumin 07/22/2024 4.6     Alkaline Phosphatase 07/22/2024 62     Total Protein " 07/22/2024 7.4     AST 07/22/2024 28     Bilirubin, Total 07/22/2024 0.9     ALT 07/22/2024 11     Amphetamine Screen, Urine 07/22/2024 Presumptive Negative     Barbiturate Screen, Urine 07/22/2024 Presumptive Negative     Benzodiazepines Screen, * 07/22/2024 Presumptive Negative     Cannabinoid Screen, Urine 07/22/2024 Presumptive Negative     Cocaine Metabolite Scree* 07/22/2024 Presumptive Negative     Fentanyl Screen, Urine 07/22/2024 Presumptive Negative     Opiate Screen, Urine 07/22/2024 Presumptive Negative     Oxycodone Screen, Urine 07/22/2024 Presumptive Negative     PCP Screen, Urine 07/22/2024 Presumptive Negative     Methadone Screen, Urine 07/22/2024 Presumptive Negative     Acetaminophen 07/22/2024 <10.0     Salicylate  07/22/2024 <3     Alcohol 07/22/2024 <10     Ventricular Rate 07/22/2024 101     Atrial Rate 07/22/2024 101     CA Interval 07/22/2024 146     QRS Duration 07/22/2024 90     QT Interval 07/22/2024 332     QTC Calculation(Bazett) 07/22/2024 430     P Axis 07/22/2024 48     R Axis 07/22/2024 62     T Axis 07/22/2024 38     QRS Count 07/22/2024 16     Q Onset 07/22/2024 219     P Onset 07/22/2024 146     P Offset 07/22/2024 197     T Offset 07/22/2024 385     QTC Fredericia 07/22/2024 395        Orders:  Diagnoses and all orders for this visit:  Other schizophrenia  -     Follow Up In Psychiatry; Future      Risk Assessment:  Risk of harm to self: Low Risk -- Risk factors include: Age and Gender Protective factors include:Denies current suicidal ideation, Denies history of suicide attempts , Future-oriented talk , Willingness to seek help and support , Skills in problem solving, conflict resolution, and nonviolent handling of disputes, Access to a variety of clinical interventions , Receiving and engaged in care for mental, physical, and substance use disorders , History of adhering to treatment recommendations and/or prescribed medication regimen , Support through ongoing medical and  mental healthcare relationships , Current/history of good response to treatment/meds , and Interpersonal relationships and supports, e.g., family, friends, peers, community     Risk of harm to others: Medium Risk - Risk factors include: Gender and History of violence or aggressive acts towards self or others (e.g. property damage, throwing objects, hitting onself, etc.) . Protective factors include: Lack of known access to firearms , Sense of community, availability/access to resources and support , Sense of optimism, hope , Interpersonal competence , Affect regulation , Sense of self-efficacy, internal locus of control , and Positive, pro-social family/peer network         Time Spent:    Prep time: 2 min  Direct patient time: 23 min  Documentation time: 5 min  Total time: 30 min      Next Appointment:  Follow up in 3 months (on 6/17/2025).

## 2025-04-11 ENCOUNTER — APPOINTMENT (OUTPATIENT)
Dept: PRIMARY CARE | Facility: CLINIC | Age: 25
End: 2025-04-11
Payer: COMMERCIAL

## 2025-06-17 ENCOUNTER — APPOINTMENT (OUTPATIENT)
Dept: BEHAVIORAL HEALTH | Facility: CLINIC | Age: 25
End: 2025-06-17
Payer: COMMERCIAL

## 2025-06-17 ENCOUNTER — TELEMEDICINE (OUTPATIENT)
Dept: BEHAVIORAL HEALTH | Facility: CLINIC | Age: 25
End: 2025-06-17
Payer: COMMERCIAL

## 2025-06-17 DIAGNOSIS — F20.89 OTHER SCHIZOPHRENIA: ICD-10-CM

## 2025-06-17 PROCEDURE — 1036F TOBACCO NON-USER: CPT | Performed by: PSYCHIATRY & NEUROLOGY

## 2025-06-17 PROCEDURE — 99213 OFFICE O/P EST LOW 20 MIN: CPT | Performed by: PSYCHIATRY & NEUROLOGY

## 2025-06-17 RX ORDER — OLANZAPINE 10 MG/1
10 TABLET, FILM COATED ORAL NIGHTLY
Qty: 90 TABLET | Refills: 3 | Status: SHIPPED | OUTPATIENT
Start: 2025-06-17 | End: 2026-06-12

## 2025-06-17 ASSESSMENT — PATIENT HEALTH QUESTIONNAIRE - PHQ9
SUM OF ALL RESPONSES TO PHQ9 QUESTIONS 1 AND 2: 0
2. FEELING DOWN, DEPRESSED OR HOPELESS: NOT AT ALL
1. LITTLE INTEREST OR PLEASURE IN DOING THINGS: NOT AT ALL

## 2025-06-17 NOTE — PROGRESS NOTES
"Adult Ambulatory Psychiatry Progress Note    Pt is at home ([address] [facility] [id number] )  Writer is at [place]    Virtual or Telephone Consent    An interactive audio and video telecommunication system which permits real time communications between the patient (at the originating site) and provider (at the distant site) was utilized to provide this telehealth service.   Verbal consent was requested and obtained from German Galarza on this date, 06/17/25 for a telehealth visit and the patient's location was confirmed at the time of the visit.      Assessment/Plan     Impression:  German Galarza is a 24 y.o. male domiciled with parents, unemployed who presents for follow up with CC of Schizophrenia.    Plan:   Schizophrenia, consider schizoaffective d/o - c/w olanzapine 10mg daily       Subjective     Chief Complaint: Schizophrenia    HPI:  Pt arrived on time. Mood \"ok, decent\" since last session. Denies significant depressed mood. Denies SI/HI. Denies harming anyone since last session. Denies AH. He just experiences \"inner thoughts\". Denies any VH. Rarely now he checks what's behind him but it may be caution about his surroundings to not get robbed. Anxiety has been manageable. He goes to sleep at around 11pm. He sleeps through the night. He wakes at 10:30am or 11am. Denies missing any doses of olanzapine. His appetite is \"somewhat out of control\" but he wants to count calories next month. He's been working at [employer] 40 hours. Recommended continuing medicine.         Objective   Mental Status Exam:  General Appearance: Well groomed, appropriate eye contact  Attitude/Behavior: Cooperative  Motor: No psychomotor agitation or retardation, no tremor or other abnormal movements  Speech: Other: (comment) (a little monotone, regular rate and volume)  Mood: \"ok\"  Affect: Constricted  Thought Process: Linear, goal directed  Thought Associations: No loosening of associations  Thought Content: " Normal  Perception: No perceptual abnormalities noted  Insight: Intact  Judgement: Intact    Vitals:  There were no vitals filed for this visit.    Current Medications:  Current Outpatient Medications on File Prior to Visit   Medication Sig Dispense Refill    [DISCONTINUED] OLANZapine (ZyPREXA) 10 mg tablet Take 1 tablet (10 mg) by mouth once daily at bedtime. 90 tablet 1     No current facility-administered medications on file prior to visit.       Lab Review:   No visits with results within 2 Month(s) from this visit.   Latest known visit with results is:   Admission on 07/22/2024, Discharged on 07/23/2024   Component Date Value    WBC 07/22/2024 7.2     nRBC 07/22/2024 0.0     RBC 07/22/2024 5.27     Hemoglobin 07/22/2024 15.3     Hematocrit 07/22/2024 43.8     MCV 07/22/2024 83     MCH 07/22/2024 29.0     MCHC 07/22/2024 34.9     RDW 07/22/2024 12.9     Platelets 07/22/2024 224     Neutrophils % 07/22/2024 66.3     Immature Granulocytes %,* 07/22/2024 0.3     Lymphocytes % 07/22/2024 22.9     Monocytes % 07/22/2024 9.5     Eosinophils % 07/22/2024 0.6     Basophils % 07/22/2024 0.4     Neutrophils Absolute 07/22/2024 4.74     Immature Granulocytes Ab* 07/22/2024 0.02     Lymphocytes Absolute 07/22/2024 1.64     Monocytes Absolute 07/22/2024 0.68     Eosinophils Absolute 07/22/2024 0.04     Basophils Absolute 07/22/2024 0.03     Glucose 07/22/2024 130 (H)     Sodium 07/22/2024 135 (L)     Potassium 07/22/2024 3.2 (L)     Chloride 07/22/2024 97 (L)     Bicarbonate 07/22/2024 24     Anion Gap 07/22/2024 17     Urea Nitrogen 07/22/2024 9     Creatinine 07/22/2024 0.97     eGFR 07/22/2024 >90     Calcium 07/22/2024 9.2     Albumin 07/22/2024 4.6     Alkaline Phosphatase 07/22/2024 62     Total Protein 07/22/2024 7.4     AST 07/22/2024 28     Bilirubin, Total 07/22/2024 0.9     ALT 07/22/2024 11     Amphetamine Screen, Urine 07/22/2024 Presumptive Negative     Barbiturate Screen, Urine 07/22/2024 Presumptive Negative      Benzodiazepines Screen, * 07/22/2024 Presumptive Negative     Cannabinoid Screen, Urine 07/22/2024 Presumptive Negative     Cocaine Metabolite Scree* 07/22/2024 Presumptive Negative     Fentanyl Screen, Urine 07/22/2024 Presumptive Negative     Opiate Screen, Urine 07/22/2024 Presumptive Negative     Oxycodone Screen, Urine 07/22/2024 Presumptive Negative     PCP Screen, Urine 07/22/2024 Presumptive Negative     Methadone Screen, Urine 07/22/2024 Presumptive Negative     Acetaminophen 07/22/2024 <10.0     Salicylate  07/22/2024 <3     Alcohol 07/22/2024 <10     Ventricular Rate 07/22/2024 101     Atrial Rate 07/22/2024 101     [place] Interval 07/22/2024 146     QRS Duration 07/22/2024 90     QT Interval 07/22/2024 332     QTC Calculation(Bazett) 07/22/2024 430     P Axis 07/22/2024 48     R Axis 07/22/2024 62     T Axis 07/22/2024 38     QRS Count 07/22/2024 16     Q Onset 07/22/2024 219     P Onset 07/22/2024 146     P Offset 07/22/2024 197     T Offset 07/22/2024 385     QTC Fredericia 07/22/2024 395        Orders:  Diagnoses and all orders for this visit:  Other schizophrenia  -     OLANZapine (ZyPREXA) 10 mg tablet; Take 1 tablet (10 mg) by mouth once daily at bedtime.  -     Follow Up In Psychiatry; Future        Risk Assessment:  Risk of harm to self: Low Risk -- Risk factors include: Age and Gender Protective factors include:Denies current suicidal ideation, Denies history of suicide attempts , Future-oriented talk , Willingness to seek help and support , Skills in problem solving, conflict resolution, and nonviolent handling of disputes, Access to a variety of clinical interventions , Receiving and engaged in care for mental, physical, and substance use disorders , History of adhering to treatment recommendations and/or prescribed medication regimen , Support through ongoing medical and mental healthcare relationships , Current/history of good response to treatment/meds , and Interpersonal relationships and  supports, e.g., family, friends, peers, community     Risk of harm to others: Medium Risk - Risk factors include: Gender and History of violence or aggressive acts towards self or others (e.g. property damage, throwing objects, hitting onself, etc.) . Protective factors include: Lack of known access to firearms , Sense of community, availability/access to resources and support , Sense of optimism, hope , Interpersonal competence , Affect regulation , Sense of self-efficacy, internal locus of control , and Positive, pro-social family/peer network     PHQ9  Over the past 2 weeks, how often have you been bothered by any of the following problems?  Little interest or pleasure in doing things: Not at all  Feeling down, depressed, or hopeless: Not at all        Next Appointment:  Follow up in 3 months (on 9/18/2025).

## 2025-08-12 ENCOUNTER — APPOINTMENT (OUTPATIENT)
Dept: BEHAVIORAL HEALTH | Facility: CLINIC | Age: 25
End: 2025-08-12
Payer: COMMERCIAL

## 2025-09-18 ENCOUNTER — APPOINTMENT (OUTPATIENT)
Dept: BEHAVIORAL HEALTH | Facility: CLINIC | Age: 25
End: 2025-09-18
Payer: COMMERCIAL

## 2025-11-04 ENCOUNTER — APPOINTMENT (OUTPATIENT)
Dept: BEHAVIORAL HEALTH | Facility: CLINIC | Age: 25
End: 2025-11-04
Payer: COMMERCIAL